# Patient Record
Sex: FEMALE | Race: BLACK OR AFRICAN AMERICAN
[De-identification: names, ages, dates, MRNs, and addresses within clinical notes are randomized per-mention and may not be internally consistent; named-entity substitution may affect disease eponyms.]

---

## 2019-03-09 ENCOUNTER — APPOINTMENT (OUTPATIENT)
Dept: ORTHOPEDIC SURGERY | Facility: CLINIC | Age: 59
End: 2019-03-09
Payer: MEDICARE

## 2019-03-09 VITALS
SYSTOLIC BLOOD PRESSURE: 139 MMHG | BODY MASS INDEX: 30.96 KG/M2 | WEIGHT: 164 LBS | HEART RATE: 70 BPM | DIASTOLIC BLOOD PRESSURE: 79 MMHG | HEIGHT: 61 IN

## 2019-03-09 DIAGNOSIS — Z80.9 FAMILY HISTORY OF MALIGNANT NEOPLASM, UNSPECIFIED: ICD-10-CM

## 2019-03-09 DIAGNOSIS — S93.491A SPRAIN OF OTHER LIGAMENT OF RIGHT ANKLE, INITIAL ENCOUNTER: ICD-10-CM

## 2019-03-09 DIAGNOSIS — Z78.9 OTHER SPECIFIED HEALTH STATUS: ICD-10-CM

## 2019-03-09 PROCEDURE — 99203 OFFICE O/P NEW LOW 30 MIN: CPT

## 2019-03-09 RX ORDER — CYCLOBENZAPRINE HYDROCHLORIDE 7.5 MG/1
TABLET, FILM COATED ORAL
Refills: 0 | Status: ACTIVE | COMMUNITY

## 2019-03-09 NOTE — PHYSICAL EXAM
[UE/LE] : Sensory: Intact in bilateral upper & lower extremities [ALL] : dorsalis pedis, posterior tibial, femoral, popliteal, and radial 2+ and symmetric bilaterally [Normal] : Oriented to person, place, and time, insight and judgement were intact and the affect was normal [Poor Appearance] : well-appearing [Acute Distress] : not in acute distress [de-identified] : Ankle \par Skin warm, Dry, no lesions, no erythema, no bleeding, no open wounds\par No Scars \par Pulse to DP and PT intact \par Sensation to touch intact \par Syndesmotic Squeeze test - Negative\par \par No deformities noted to foot or ankle \par \par Swelling - positive swelling over lateral mall\par Tenderness - ATFL and CF. mild tenderness to 5th metatarsal \par No tenderness to \par Lateral Mall, Medial Mall, Achilles, Calcaneus, mid foot, toes. \par No Anterior Drawer noted \par Barahona Test - reveals intact Achilles  \par Torres's Test Negative \par \par ROM\par Plantarflexion - 0-50\par Dorsiflexion - 0-20\par Inversion - 0-35\par Eversion 0-15\par \par No tenderness to proximal fibula\par  [de-identified] : 3 view right foot xray reveals no acute fx \par 3 view right ankle xray reveals no acute fx

## 2019-03-09 NOTE — DISCUSSION/SUMMARY
[de-identified] : Discussion had with patient \par Patient will follow up with Stephanie as needed\par Patient aware must follow up with MD for further eval and treatment \par Patient will start Physical Therapy \par Patients questions were answered and patient is satisfied with today's visit\par

## 2019-03-09 NOTE — HISTORY OF PRESENT ILLNESS
[Pain Location] : pain [de-identified] : Patient is a 58 year old female who presents c/o right ankle pain s/p tripping and twisting ankle last week. patient states pain located to lateral ankle positive swelling. patient has been weight bearing and using wrap for ankle

## 2019-07-12 ENCOUNTER — APPOINTMENT (OUTPATIENT)
Dept: DERMATOLOGY | Facility: CLINIC | Age: 59
End: 2019-07-12
Payer: MEDICARE

## 2019-07-12 PROCEDURE — 99203 OFFICE O/P NEW LOW 30 MIN: CPT

## 2019-07-15 ENCOUNTER — APPOINTMENT (OUTPATIENT)
Dept: ORTHOPEDIC SURGERY | Facility: CLINIC | Age: 59
End: 2019-07-15
Payer: MEDICARE

## 2019-07-15 ENCOUNTER — TRANSCRIPTION ENCOUNTER (OUTPATIENT)
Age: 59
End: 2019-07-15

## 2019-07-15 VITALS
HEIGHT: 61 IN | HEART RATE: 78 BPM | WEIGHT: 164 LBS | SYSTOLIC BLOOD PRESSURE: 118 MMHG | BODY MASS INDEX: 30.96 KG/M2 | DIASTOLIC BLOOD PRESSURE: 72 MMHG

## 2019-07-15 DIAGNOSIS — M25.561 PAIN IN RIGHT KNEE: ICD-10-CM

## 2019-07-15 DIAGNOSIS — M22.2X1 PATELLOFEMORAL DISORDERS, RIGHT KNEE: ICD-10-CM

## 2019-07-15 DIAGNOSIS — M54.41 LUMBAGO WITH SCIATICA, RIGHT SIDE: ICD-10-CM

## 2019-07-15 PROCEDURE — 99214 OFFICE O/P EST MOD 30 MIN: CPT

## 2019-07-15 RX ORDER — IBUPROFEN 600 MG/1
600 TABLET, FILM COATED ORAL 3 TIMES DAILY
Qty: 40 | Refills: 3 | Status: ACTIVE | COMMUNITY
Start: 2019-07-15 | End: 1900-01-01

## 2019-07-15 NOTE — HISTORY OF PRESENT ILLNESS
[FreeTextEntry1] : Mateo is a new patient with right-sided lower back pain and sciatica. She does have sciatic symptoms going down the leg. Denies bowel or bladder incontinence. No foot drop present. Has had an epidural steroid injection in the past which helped her. Pain scale today 5/10.

## 2019-07-15 NOTE — DISCUSSION/SUMMARY
[de-identified] : Assessment: Right-sided low back pain with sciatica right lower extremity.\par \par Plan:\par At this point in time I would like her to followup with team physiatry. She can followup with us on an as-needed basis. I explained to her that stretching and strengthening program at physical therapy is important. I gave her a physical therapy prescription today to start outpatient physical therapy. I also gave her ibuprofen 600 mg. All of her questions were answered and she is on board with this treatment course.

## 2019-07-15 NOTE — PHYSICAL EXAM
[de-identified] : Lumbar spine:\par \par General: Alert and oriented x3.  In no acute distress.  Pleasant in nature with a normal affect.  No apparent respiratory distress. \par Inspection: No swelling, No scoliosis present.\par \par ROM:\par Forward Flexion: 75 degrees\par Extension: 10 degrees\par Lateral Flexion to Left: 40 degrees\par Lateral Flexion to Right: 40 degrees\par Rotation to Left: 45 degrees\par Rotation to Right: 45 degrees\par \par Normal Hip ROM.\par \par Palpation: \par Thoracolumbar Paraspinal Muscles: Post\par Costovertebral Angle Pain/Spine Percussion: Negative for pain over the Kidney's with Spine Percussion.\par \par Special Tests:\par Straight Leg Raise: Positive right lower extremity\par \par Neurovascularly Intact Sensory and Motor with No Foot Drop present on examination today.  [de-identified] : No imaging performed today.

## 2019-10-04 ENCOUNTER — APPOINTMENT (OUTPATIENT)
Dept: DERMATOLOGY | Facility: CLINIC | Age: 59
End: 2019-10-04
Payer: MEDICARE

## 2019-10-04 PROCEDURE — 99213 OFFICE O/P EST LOW 20 MIN: CPT

## 2019-12-20 ENCOUNTER — APPOINTMENT (OUTPATIENT)
Dept: DERMATOLOGY | Facility: CLINIC | Age: 59
End: 2019-12-20
Payer: MEDICARE

## 2019-12-20 PROCEDURE — 99213 OFFICE O/P EST LOW 20 MIN: CPT

## 2019-12-20 RX ORDER — FLUOCINONIDE 0.05 MG/G
0.05 OINTMENT TOPICAL TWICE DAILY
Qty: 1 | Refills: 2 | Status: ACTIVE | COMMUNITY
Start: 2019-10-04 | End: 1900-01-01

## 2019-12-30 ENCOUNTER — TRANSCRIPTION ENCOUNTER (OUTPATIENT)
Age: 59
End: 2019-12-30

## 2020-01-08 ENCOUNTER — APPOINTMENT (OUTPATIENT)
Dept: ORTHOPEDIC SURGERY | Facility: CLINIC | Age: 60
End: 2020-01-08
Payer: MEDICARE

## 2020-01-08 DIAGNOSIS — M75.51 BURSITIS OF RIGHT SHOULDER: ICD-10-CM

## 2020-01-08 DIAGNOSIS — M75.81 OTHER SHOULDER LESIONS, RIGHT SHOULDER: ICD-10-CM

## 2020-01-08 PROCEDURE — 99214 OFFICE O/P EST MOD 30 MIN: CPT | Mod: 25

## 2020-01-08 PROCEDURE — 20610 DRAIN/INJ JOINT/BURSA W/O US: CPT | Mod: RT

## 2020-01-08 RX ORDER — PANTOPRAZOLE 40 MG/1
40 TABLET, DELAYED RELEASE ORAL
Qty: 30 | Refills: 0 | Status: ACTIVE | COMMUNITY
Start: 2020-01-08 | End: 1900-01-01

## 2020-01-08 NOTE — HISTORY OF PRESENT ILLNESS
[FreeTextEntry1] : 01/08/2020: The patient is a 59-year-old right-hand-dominant female who presents to the office with right shoulder pain. She denies any specific injury but said it could be from a trip and fall in August of 2019. She describes the pain as in the superior aspect of the shoulder with radiation to the lateral aspect of the shoulder. She also has some right-sided neck pain. She finds that her symptoms are worse at night when she is trying to find a position of comfort in bed. She has taken Advil, Flexeril and ice with mild relief. She was recently seen at urgent care and x-rays were done around [place] showed no fractures or dislocations.

## 2020-01-08 NOTE — PROCEDURE
[FreeTextEntry1] : Injection: Shoulder Right\par \par There was a discussion with the patient regarding this procedure and all questions/concerns were answered.  All of the risks, benefits and alternatives were discussed at length.  These include but are not limited to bleeding, infection, and allergic reaction.  Alcohol was used to clean, sterilize and prep the skin at the injection site on the posterolateral aspect of the shoulder.  Ethyl chloride spray was used as a topical anesthetic.  A 22G needle was used to inject 3cc of 1% lidocaine and 1cc of 40mg/mL methylprednisolone into the shoulder.  A sterile bandage was applied to the site of the injection.  The patient tolerated the procedure well and there were no complications.\par \par

## 2020-01-08 NOTE — ASSESSMENT
[FreeTextEntry1] : Patient with right shoulder pain consistent with rotator cuff tendinitis and bursitis of the right shoulder. The nature of these conditions were described at length with the patient she verbalized understanding. Today a cortisone injection was administered to the right subacromial space and the patient tolerated the procedure well. I will start the patient on meloxicam for an anti-inflammatory. The patient was also given a physical therapy prescription. She will follow back up in the office in 4-6 weeks should her symptoms not improve for further treatment options.

## 2020-01-08 NOTE — CONSULT LETTER
[Consult Letter:] : I had the pleasure of evaluating your patient, [unfilled]. [Please see my note below.] : Please see my note below. [Consult Closing:] : Thank you very much for allowing me to participate in the care of this patient.  If you have any questions, please do not hesitate to contact me. [Sincerely,] : Sincerely, [FreeTextEntry3] : Dr. Ioana Andrews\par Orthopaedic Surgery\par Hand & Upper Extremity.\par

## 2020-01-08 NOTE — PHYSICAL EXAM
[Normal Finger/nose] : finger to nose coordination [Normal] : Oriented to person, place, and time, insight and judgement were intact and the affect was normal [de-identified] : Right Shoulder Exam\par \par Inspection: No malalignment, No defects\par Skin: No masses, No lesions\par Neck: Negative Spurlings, full ROM without pain\par ROM: Full range of motion of the right shoulder with forward flexion, abduction, internal and external rotation\par Painful arc ROM: Overhead\par Tenderness: No bicipital tenderness, no tenderness to the greater tuberosity/RTC insertion, no anterior shoulder/lesser tuberosity tenderness\par Strength: 5/5 ER, 5/5 IR in adduction, 5/5 supraspinatus testing\par AC Joint: No ttp, no pain with cross arm testing\par Biceps: Speed negative, Yergusons negative\par Impingement test: Positive Smith\par Stability: Negative apprehension, negative anterior/posterior load and shift\par Vasc: 2+ radial pulse\par Neuro: AIN, PIN, Ulnar nerve in tact to motor\par Sensation: In tact to light touch throughout\par \par  [de-identified] : RUIR [de-identified] : 3 x-ray views of the right shoulder were reviewed from an outside institution. There are no fractures or dislocations noted.

## 2020-04-03 ENCOUNTER — RX RENEWAL (OUTPATIENT)
Age: 60
End: 2020-04-03

## 2020-05-06 ENCOUNTER — RX RENEWAL (OUTPATIENT)
Age: 60
End: 2020-05-06

## 2020-05-06 RX ORDER — MELOXICAM 15 MG/1
15 TABLET ORAL
Qty: 30 | Refills: 0 | Status: ACTIVE | COMMUNITY
Start: 2019-07-15 | End: 1900-01-01

## 2020-06-11 ENCOUNTER — RX RENEWAL (OUTPATIENT)
Age: 60
End: 2020-06-11

## 2020-07-01 ENCOUNTER — APPOINTMENT (OUTPATIENT)
Dept: DERMATOLOGY | Facility: CLINIC | Age: 60
End: 2020-07-01
Payer: MEDICARE

## 2020-07-01 VITALS — TEMPERATURE: 97.1 F

## 2020-07-01 PROCEDURE — 96372 THER/PROPH/DIAG INJ SC/IM: CPT

## 2020-07-01 PROCEDURE — 99213 OFFICE O/P EST LOW 20 MIN: CPT | Mod: 25

## 2022-04-05 ENCOUNTER — APPOINTMENT (OUTPATIENT)
Dept: DERMATOLOGY | Facility: CLINIC | Age: 62
End: 2022-04-05
Payer: MEDICARE

## 2022-04-05 DIAGNOSIS — R21 RASH AND OTHER NONSPECIFIC SKIN ERUPTION: ICD-10-CM

## 2022-04-05 PROCEDURE — 96372 THER/PROPH/DIAG INJ SC/IM: CPT

## 2022-04-05 PROCEDURE — 99214 OFFICE O/P EST MOD 30 MIN: CPT | Mod: 25

## 2022-04-05 RX ORDER — BETAMETHASONE DIPROPIONATE 0.5 MG/G
0.05 OINTMENT, AUGMENTED TOPICAL TWICE DAILY
Qty: 50 | Refills: 2 | Status: ACTIVE | COMMUNITY
Start: 2022-04-05 | End: 1900-01-01

## 2022-04-05 NOTE — ASSESSMENT
[FreeTextEntry1] : 1. Lichen planus\par on extremities, abdomen, flaring\par - c/w betamethasone ointment PRN itch for 2 weeks on, 2 weeks off; SED\par - IMK as below\par \par Intramuscular injection of Kenalog, 40 mg/ml\par A total of 1 ml was injected to the L deltoid muscle.\par The risks/benefits/alternatives of intramuscular steroid injection were reviewed with the patient which include but are not limited to pain, atrophy, or dispigmentation at injection site; damage to nerve or blood vessels; prolonged exposure to steroid which may result in increase in blood pressure or blood sugar, and fracture or necrosis of bones and joints. The patient tolerated the procedure well. \par

## 2022-04-05 NOTE — PHYSICAL EXAM
[Alert] : alert [Oriented x 3] : ~L oriented x 3 [Well Nourished] : well nourished [Conjunctiva Non-injected] : conjunctiva non-injected [No Visual Lymphadenopathy] : no visual  lymphadenopathy [No Clubbing] : no clubbing [No Edema] : no edema [No Bromhidrosis] : no bromhidrosis [No Chromhidrosis] : no chromhidrosis [FreeTextEntry3] : scattered red/purple excoriated papules on arms, legs, abdomen

## 2022-04-05 NOTE — HISTORY OF PRESENT ILLNESS
[FreeTextEntry1] : f/u lichen planus [de-identified] : 61 year old female here with lichen planusx years. Patient notes flaring over the past week on legs and abdomen. Using betamethasone BID with some improvement. Patient interested in IMK as this has helped in the past with flares. \par \par Previous tx:  Has previously been treated with UVB with some success but did not want to commitment to treatments again because was taking pill prior to tx but said it was too expensive.

## 2022-05-03 ENCOUNTER — APPOINTMENT (OUTPATIENT)
Dept: DERMATOLOGY | Facility: CLINIC | Age: 62
End: 2022-05-03
Payer: MEDICARE

## 2022-05-03 PROCEDURE — 99214 OFFICE O/P EST MOD 30 MIN: CPT

## 2022-05-03 RX ORDER — BETAMETHASONE DIPROPIONATE 0.5 MG/G
0.05 OINTMENT, AUGMENTED TOPICAL TWICE DAILY
Qty: 1 | Refills: 2 | Status: DISCONTINUED | COMMUNITY
Start: 2020-07-01 | End: 2022-05-03

## 2022-05-03 RX ORDER — TRIAMCINOLONE ACETONIDE 1 MG/G
0.1 OINTMENT TOPICAL
Qty: 1 | Refills: 0 | Status: ACTIVE | COMMUNITY
Start: 2019-07-12 | End: 1900-01-01

## 2022-06-14 ENCOUNTER — APPOINTMENT (OUTPATIENT)
Dept: DERMATOLOGY | Facility: CLINIC | Age: 62
End: 2022-06-14
Payer: MEDICARE

## 2022-06-14 DIAGNOSIS — D48.5 NEOPLASM OF UNCERTAIN BEHAVIOR OF SKIN: ICD-10-CM

## 2022-06-14 LAB
ALBUMIN SERPL ELPH-MCNC: 4.7 G/DL
ALP BLD-CCNC: 95 U/L
ALT SERPL-CCNC: 23 U/L
ANION GAP SERPL CALC-SCNC: 14 MMOL/L
AST SERPL-CCNC: 21 U/L
BASOPHILS # BLD AUTO: 0.02 K/UL
BASOPHILS NFR BLD AUTO: 0.3 %
BILIRUB SERPL-MCNC: 0.3 MG/DL
BUN SERPL-MCNC: 14 MG/DL
CALCIUM SERPL-MCNC: 9.5 MG/DL
CHLORIDE SERPL-SCNC: 104 MMOL/L
CO2 SERPL-SCNC: 24 MMOL/L
CREAT SERPL-MCNC: 0.73 MG/DL
EGFR: 95 ML/MIN/1.73M2
EOSINOPHIL # BLD AUTO: 0.1 K/UL
EOSINOPHIL NFR BLD AUTO: 1.7 %
GLUCOSE SERPL-MCNC: 81 MG/DL
HCT VFR BLD CALC: 40.6 %
HGB BLD-MCNC: 12.9 G/DL
IMM GRANULOCYTES NFR BLD AUTO: 0.7 %
LYMPHOCYTES # BLD AUTO: 2.03 K/UL
LYMPHOCYTES NFR BLD AUTO: 33.7 %
MAN DIFF?: NORMAL
MCHC RBC-ENTMCNC: 28.4 PG
MCHC RBC-ENTMCNC: 31.8 GM/DL
MCV RBC AUTO: 89.4 FL
MONOCYTES # BLD AUTO: 0.64 K/UL
MONOCYTES NFR BLD AUTO: 10.6 %
NEUTROPHILS # BLD AUTO: 3.2 K/UL
NEUTROPHILS NFR BLD AUTO: 53 %
PLATELET # BLD AUTO: 247 K/UL
POTASSIUM SERPL-SCNC: 4.1 MMOL/L
PROT SERPL-MCNC: 7.4 G/DL
RBC # BLD: 4.54 M/UL
RBC # FLD: 13 %
SODIUM SERPL-SCNC: 141 MMOL/L
WBC # FLD AUTO: 6.03 K/UL

## 2022-06-14 PROCEDURE — 11102 TANGNTL BX SKIN SINGLE LES: CPT

## 2022-06-14 PROCEDURE — 99214 OFFICE O/P EST MOD 30 MIN: CPT | Mod: 25

## 2022-06-15 LAB
HAV IGM SER QL: NONREACTIVE
HBV CORE IGG+IGM SER QL: NONREACTIVE
HBV CORE IGM SER QL: NONREACTIVE
HBV CORE IGM SER QL: NONREACTIVE
HBV SURFACE AB SER QL: NONREACTIVE
HBV SURFACE AG SER QL: NONREACTIVE
HBV SURFACE AG SER QL: NONREACTIVE
HCV AB SER QL: NONREACTIVE
HCV S/CO RATIO: 0.14 S/CO
HIV1+2 AB SPEC QL IA.RAPID: NONREACTIVE

## 2022-06-17 ENCOUNTER — NON-APPOINTMENT (OUTPATIENT)
Age: 62
End: 2022-06-17

## 2022-06-20 ENCOUNTER — APPOINTMENT (OUTPATIENT)
Dept: DERMATOLOGY | Facility: CLINIC | Age: 62
End: 2022-06-20
Payer: MEDICARE

## 2022-06-20 PROCEDURE — 96910 PHOTCHMTX TAR&UVB/PTRLTM&UVB: CPT

## 2022-06-22 ENCOUNTER — APPOINTMENT (OUTPATIENT)
Dept: DERMATOLOGY | Facility: CLINIC | Age: 62
End: 2022-06-22
Payer: MEDICARE

## 2022-06-22 PROCEDURE — 96910 PHOTCHMTX TAR&UVB/PTRLTM&UVB: CPT

## 2022-06-22 NOTE — DISCUSSION/SUMMARY
[FreeTextEntry1] : The patient presents for UVB Light therapy\par The patient tolerated the last treatment well, without significant erythema or a burn.\par The patient confirmed that there are no new medications.\par The patient was wearing the appropriate UV protective eye glasses.\par The treatment was administered as follow.\par \par 0.2 Joules. Maximum time 0 minutes and 20 seconds\par \par Lindsay #1\par \par The patient tolerated the treatment well and was instructed to notify the office if there is significant redness or a burn over the next 48 hours.\par

## 2022-06-24 ENCOUNTER — APPOINTMENT (OUTPATIENT)
Dept: DERMATOLOGY | Facility: CLINIC | Age: 62
End: 2022-06-24
Payer: MEDICARE

## 2022-06-24 PROCEDURE — 96910 PHOTCHMTX TAR&UVB/PTRLTM&UVB: CPT

## 2022-06-25 NOTE — DISCUSSION/SUMMARY
[FreeTextEntry1] : The patient presents for UVB Light therapy\par The patient tolerated the last treatment well, without significant erythema or a burn.\par The patient confirmed that there are no new medications.\par The patient was wearing the appropriate UV protective eye glasses.\par The treatment was administered as follow.\par \par 0.3 Joules. Maximum time 0 minutes and 30 seconds\par \par Lindsay #1\par \par The patient tolerated the treatment well and was instructed to notify the office if there is significant redness or a burn over the next 48 hours.\par

## 2022-06-27 ENCOUNTER — APPOINTMENT (OUTPATIENT)
Dept: DERMATOLOGY | Facility: CLINIC | Age: 62
End: 2022-06-27

## 2022-06-27 PROCEDURE — 96910 PHOTCHMTX TAR&UVB/PTRLTM&UVB: CPT

## 2022-06-27 NOTE — DISCUSSION/SUMMARY
[FreeTextEntry1] : The patient presents for UVB light therapy.\par The patient tolerated the last treatment well, without significant erythema or a burn.\par The patient confirmed that there are no new medications. \par The patient was wearing the appropriate UV protective eye glasses.\par The treatment was administrated as follows:\par \par       0.4   Joules.   Maximum time      0     minutes and       40       seconds.\par \par Lindsay# 1\par \par The patient tolerated the treatment well and was instructed to notify the office of there is significant redness or a burn over the next 48 hours.\par

## 2022-06-29 ENCOUNTER — APPOINTMENT (OUTPATIENT)
Dept: DERMATOLOGY | Facility: CLINIC | Age: 62
End: 2022-06-29

## 2022-06-29 PROCEDURE — 96910 PHOTCHMTX TAR&UVB/PTRLTM&UVB: CPT

## 2022-06-29 NOTE — DISCUSSION/SUMMARY
[FreeTextEntry1] : The patient presents for UVB Light therapy\par The patient tolerated the last treatment well, without significant erythema or a burn.\par The patient confirmed that there are no new medications.\par The patient was wearing the appropriate UV protective eye glasses.\par The treatment was administered as follow.\par \par 0.5 Joules. Maximum time 0 minutes and 50 seconds\par \par Lindsay #1\par \par The patient tolerated the treatment well and was instructed to notify the office if there is significant redness or a burn over the next 48 hours.\par

## 2022-07-01 NOTE — DISCUSSION/SUMMARY
[FreeTextEntry1] : The patient presents for UVB light therapy.\par The patient tolerated the last treatment well, without significant erythema or a burn.\par The patient confirmed that there are no new medications. \par The patient was wearing the appropriate UV protective eye glasses.\par The treatment was administrated as follows:\par \par      0.6    Joules.   Maximum time       1    minutes and       00       seconds.\par \par Lindsay# 1\par \par The patient tolerated the treatment well and was instructed to notify the office of there is significant redness or a burn over the next 48 hours.\par

## 2022-07-05 ENCOUNTER — APPOINTMENT (OUTPATIENT)
Dept: DERMATOLOGY | Facility: CLINIC | Age: 62
End: 2022-07-05

## 2022-07-05 PROCEDURE — 96910 PHOTCHMTX TAR&UVB/PTRLTM&UVB: CPT

## 2022-07-07 ENCOUNTER — APPOINTMENT (OUTPATIENT)
Dept: DERMATOLOGY | Facility: CLINIC | Age: 62
End: 2022-07-07

## 2022-07-07 PROCEDURE — 96910 PHOTCHMTX TAR&UVB/PTRLTM&UVB: CPT

## 2022-07-07 NOTE — DISCUSSION/SUMMARY
[FreeTextEntry1] : The patient presents for UVB Light therapy\par The patient tolerated the last treatment well, without significant erythema or a burn.\par The patient confirmed that there are no new medications.\par The patient was wearing the appropriate UV protective eye glasses.\par The treatment was administered as follow.\par \par  0.7Joules. Maximum time 1 minutes and 10 seconds\par \par Lindsay #1\par \par The patient tolerated the treatment well and was instructed to notify the office if there is significant redness or a burn over the next 48 hours.\par

## 2022-07-11 ENCOUNTER — APPOINTMENT (OUTPATIENT)
Dept: DERMATOLOGY | Facility: CLINIC | Age: 62
End: 2022-07-11

## 2022-07-11 PROCEDURE — 96910 PHOTCHMTX TAR&UVB/PTRLTM&UVB: CPT

## 2022-07-11 NOTE — DISCUSSION/SUMMARY
[FreeTextEntry1] : The patient presents for UVB light therapy.\par The patient tolerated the last treatment well, without significant erythema or a burn.\par The patient confirmed that there are no new medications. \par The patient was wearing the appropriate UV protective eye glasses.\par The treatment was administrated as follows:\par \par        0.8  Joules.   Maximum time        1   minutes and       20       seconds.\par \par Lindsay# 1\par \par The patient tolerated the treatment well and was instructed to notify the office of there is significant redness or a burn over the next 48 hours.\par

## 2022-07-13 ENCOUNTER — APPOINTMENT (OUTPATIENT)
Dept: DERMATOLOGY | Facility: CLINIC | Age: 62
End: 2022-07-13

## 2022-07-13 PROCEDURE — 96910 PHOTCHMTX TAR&UVB/PTRLTM&UVB: CPT

## 2022-07-13 NOTE — DISCUSSION/SUMMARY
[FreeTextEntry1] : The patient presents for UVB light therapy.\par The patient tolerated the last treatment well, without significant erythema or a burn.\par The patient confirmed that there are no new medications.\par The patient was wearing the appropriate UV protective eye glasses.\par The treatment was administered as follows:\par \par 0.9 Joules. Maximum time 1 minutes and 31 seconds\par \par Lindsay # 1\par \par The patient tolerated the treatment well and was instructed to notify the office of there is significant redness or a burn over the next 48 hours.\par \par \par

## 2022-07-15 ENCOUNTER — APPOINTMENT (OUTPATIENT)
Dept: DERMATOLOGY | Facility: CLINIC | Age: 62
End: 2022-07-15

## 2022-07-15 PROCEDURE — 96910 PHOTCHMTX TAR&UVB/PTRLTM&UVB: CPT

## 2022-07-18 ENCOUNTER — APPOINTMENT (OUTPATIENT)
Dept: DERMATOLOGY | Facility: CLINIC | Age: 62
End: 2022-07-18

## 2022-07-18 PROCEDURE — 96910 PHOTCHMTX TAR&UVB/PTRLTM&UVB: CPT

## 2022-07-18 NOTE — DISCUSSION/SUMMARY
[FreeTextEntry1] : The patient presents for UVB light therapy.\par The patient tolerated the last treatment well, without significant erythema or a burn.\par The patient confirmed that there are no new medications. \par The patient was wearing the appropriate UV protective eye glasses.\par The treatment was administrated as follows:\par \par       1.0   Joules.   Maximum time      1     minutes and         41     seconds.\par \par Lindsay# 1\par \par The patient tolerated the treatment well and was instructed to notify the office of there is significant redness or a burn over the next 48 hours.\par

## 2022-07-18 NOTE — DISCUSSION/SUMMARY
[FreeTextEntry1] : The patient presents for UVB Light therapy\par The patient tolerated the last treatment well, without significant erythema or a burn.\par The patient confirmed that there are no new medications.\par The patient was wearing the appropriate UV protective eye glasses.\par The treatment was administered as follow.\par \par 1.1 Joules. Maximum time 1 minutes and 51 seconds\par \par Lindsay #1\par \par The patient tolerated the treatment well and was instructed to notify the office if there is significant redness or a burn over the next 48 hours.\par

## 2022-07-20 NOTE — DISCUSSION/SUMMARY
[FreeTextEntry1] : The patient presents for UVB light therapy.\par The patient tolerated the last treatment well, without significant erythema or a burn.\par The patient confirmed that there are no new medications.\par The patient was wearing the appropriate UV protective eye glasses.\par The treatment was administered as follows:\par \par 1.2 Joules.     Maximum time 2 minutes and 01 seconds\par \par Lindsay # 1\par \par The patient tolerated the treatment well and was instructed to notify the office of there is significant redness or a burn over the next 48 hours.\par \par \par

## 2022-07-25 ENCOUNTER — APPOINTMENT (OUTPATIENT)
Dept: DERMATOLOGY | Facility: CLINIC | Age: 62
End: 2022-07-25

## 2022-07-25 PROCEDURE — 96910 PHOTCHMTX TAR&UVB/PTRLTM&UVB: CPT

## 2022-07-27 ENCOUNTER — APPOINTMENT (OUTPATIENT)
Dept: DERMATOLOGY | Facility: CLINIC | Age: 62
End: 2022-07-27

## 2022-07-27 PROCEDURE — 96910 PHOTCHMTX TAR&UVB/PTRLTM&UVB: CPT

## 2022-07-27 NOTE — DISCUSSION/SUMMARY
[FreeTextEntry1] : The patient presents for UVB light therapy.\par The patient tolerated the last treatment well, without significant erythema or a burn.\par The patient confirmed that there are no new medications.\par The patient was wearing the appropriate UV protective eye glasses.\par The treatment was administered as follows:\par \par 1.3  Joules. Maximum time 2 minutes and 11 seconds\par \par Lindsay # 1\par \par The patient tolerated the treatment well and was instructed to notify the office of there is significant redness or a burn over the next 48 hours.\par \par \par

## 2022-07-29 ENCOUNTER — APPOINTMENT (OUTPATIENT)
Dept: DERMATOLOGY | Facility: CLINIC | Age: 62
End: 2022-07-29

## 2022-07-29 PROCEDURE — 96910 PHOTCHMTX TAR&UVB/PTRLTM&UVB: CPT

## 2022-07-29 NOTE — DISCUSSION/SUMMARY
[FreeTextEntry1] : The patient presents for UVB Light therapy\par The patient tolerated the last treatment well, without significant erythema or a burn.\par The patient confirmed that there are no new medications.\par The patient was wearing the appropriate UV protective eye glasses.\par The treatment was administered as follow.\par \par 1.4 Joules. Maximum time 2 minutes and 21 seconds\par \par Lindsay #1\par \par The patient tolerated the treatment well and was instructed to notify the office if there is significant redness or a burn over the next 48 hours.\par

## 2022-08-01 ENCOUNTER — APPOINTMENT (OUTPATIENT)
Dept: DERMATOLOGY | Facility: CLINIC | Age: 62
End: 2022-08-01

## 2022-08-01 PROCEDURE — 96910 PHOTCHMTX TAR&UVB/PTRLTM&UVB: CPT

## 2022-08-01 NOTE — DISCUSSION/SUMMARY
[FreeTextEntry1] : The patient presents for UVB light therapy.\par The patient tolerated the last treatment well, without significant erythema or a burn.\par The patient confirmed that there are no new medications. \par The patient was wearing the appropriate UV protective eye glasses.\par The treatment was administrated as follows:\par \par     1.5     Joules.   Maximum time       2    minutes and       31       seconds.\par \par Lindsay# 1\par \par The patient tolerated the treatment well and was instructed to notify the office of there is significant redness or a burn over the next 48 hours.\par

## 2022-08-03 ENCOUNTER — APPOINTMENT (OUTPATIENT)
Dept: DERMATOLOGY | Facility: CLINIC | Age: 62
End: 2022-08-03

## 2022-08-03 PROCEDURE — 96910 PHOTCHMTX TAR&UVB/PTRLTM&UVB: CPT

## 2022-08-03 NOTE — DISCUSSION/SUMMARY
[FreeTextEntry1] : The patient presents for UVB Light therapy\par The patient tolerated the last treatment well, without significant erythema or a burn.\par The patient confirmed that there are no new medications.\par The patient was wearing the appropriate UV protective eye glasses.\par The treatment was administered as follow.\par \par 1.6 Joules. Maximum time 2 minutes and 41 seconds\par \par Lindsay #1\par \par The patient tolerated the treatment well and was instructed to notify the office if there is significant redness or a burn over the next 48 hours.\par

## 2022-08-05 ENCOUNTER — APPOINTMENT (OUTPATIENT)
Dept: DERMATOLOGY | Facility: CLINIC | Age: 62
End: 2022-08-05

## 2022-08-05 PROCEDURE — 96910 PHOTCHMTX TAR&UVB/PTRLTM&UVB: CPT

## 2022-08-08 ENCOUNTER — APPOINTMENT (OUTPATIENT)
Dept: DERMATOLOGY | Facility: CLINIC | Age: 62
End: 2022-08-08

## 2022-08-08 PROCEDURE — 96910 PHOTCHMTX TAR&UVB/PTRLTM&UVB: CPT

## 2022-08-08 NOTE — DISCUSSION/SUMMARY
[FreeTextEntry1] : The patient presents for UVB light therapy.\par The patient tolerated the last treatment well, without significant erythema or a burn.\par The patient confirmed that there are no new medications.\par The patient wearing the appropriate UV protective eye glasses.\par The treatment was administered as follows:\par   1.7       Joules.  Maximum time   2    minutes and     51      seconds.\par \par Lindsay #:  1\par \par The patient tolerated the treatment well and was instructed to notify the office if there is significant redness or a burn over the next 48 hours.\par

## 2022-08-08 NOTE — DISCUSSION/SUMMARY
[FreeTextEntry1] : The patient presents for UVB Light therapy\par The patient tolerated the last treatment well, without significant erythema or a burn.\par The patient confirmed that there are no new medications.\par The patient was wearing the appropriate UV protective eye glasses.\par The treatment was administered as follow.\par \par 1.8 Joules. Maximum time 3 minutes and 02 seconds\par \par Lindsay #1\par \par The patient tolerated the treatment well and was instructed to notify the office if there is significant redness or a burn over the next 48 hours.\par

## 2022-08-10 ENCOUNTER — APPOINTMENT (OUTPATIENT)
Dept: DERMATOLOGY | Facility: CLINIC | Age: 62
End: 2022-08-10

## 2022-08-10 PROCEDURE — 96910 PHOTCHMTX TAR&UVB/PTRLTM&UVB: CPT

## 2022-08-10 NOTE — DISCUSSION/SUMMARY
[FreeTextEntry1] : The patient presents for UVB light therapy.\par The patient tolerated the last treatment well, without significant erythema or a burn.\par The patient confirmed that there are no new medications.\par The patient was wearing the appropriate UV protective eye glasses.\par The treatment was administered as follows:\par \par 1.9 Joules. Maximum time 3 minutes and 12 seconds\par \par Lindsay #1 \par \par The patient tolerated the treatment well and was instructed to notify the office of there is significant redness or a burn over the next 48 hours.\par \par \par

## 2022-08-12 NOTE — DISCUSSION/SUMMARY
[FreeTextEntry1] : The patient presents for UVB therapy.\par The patient tolerated the last treatment well, without significant erythema or a burn.\par The patient confirmed that there are no new medications.\par The patient was wearing the appropriate UV protective eye glasses.\par The treatment was administered as follows:\par    2.0       Joules.     Maximum time  3     minutes and   22     seconds.\par \par   \par Lindsay # 1\par \par The patient tolerated the treatment well and was instructed to notify the office if there is a significant\par redness or a burn over the next 48 hours.\par

## 2022-08-15 ENCOUNTER — APPOINTMENT (OUTPATIENT)
Dept: DERMATOLOGY | Facility: CLINIC | Age: 62
End: 2022-08-15

## 2022-08-15 PROCEDURE — 96910 PHOTCHMTX TAR&UVB/PTRLTM&UVB: CPT

## 2022-08-17 ENCOUNTER — APPOINTMENT (OUTPATIENT)
Dept: DERMATOLOGY | Facility: CLINIC | Age: 62
End: 2022-08-17

## 2022-08-17 PROCEDURE — 96910 PHOTCHMTX TAR&UVB/PTRLTM&UVB: CPT

## 2022-08-17 NOTE — DISCUSSION/SUMMARY
[FreeTextEntry1] :  The patient presents for UVB light therapy.\par The patient tolerated the last treatment well, without significant erythema or a burn.\par The patient confirmed that there are no new medications.\par The patient wearing the appropriate UV protective eye glasses.\par The treatment was administered as follows:\par    2.0     Joules. Maximum time  3    minutes and   22   seconds.\par \par \par Lindsay#  1\par \par The patient tolerated the treatment well and was instructed to notify the office if there is significant redness or a burn over the next 48 hours.\par

## 2022-08-23 NOTE — DISCUSSION/SUMMARY
[FreeTextEntry1] : The patient presents for UVB light therapy.\par The patient tolerated the last treatment well, without significant erythema or a burn.\par The patient confirmed that there are no new medications.\par The patient was wearing the appropriate UV protective eye glasses.\par The treatment was administered as follows:\par \par 2.0 Joules. Maximum time 3 minutes and 22 seconds\par \par Lindsay # 1\par \par The patient tolerated the treatment well and was instructed to notify the office of there is significant redness or a burn over the next 48 hours.\par \par \par

## 2022-08-24 ENCOUNTER — APPOINTMENT (OUTPATIENT)
Dept: DERMATOLOGY | Facility: CLINIC | Age: 62
End: 2022-08-24

## 2022-08-24 PROCEDURE — 96910 PHOTCHMTX TAR&UVB/PTRLTM&UVB: CPT

## 2022-08-26 ENCOUNTER — APPOINTMENT (OUTPATIENT)
Dept: DERMATOLOGY | Facility: CLINIC | Age: 62
End: 2022-08-26

## 2022-08-26 PROCEDURE — 96910 PHOTCHMTX TAR&UVB/PTRLTM&UVB: CPT

## 2022-08-26 NOTE — DISCUSSION/SUMMARY
[FreeTextEntry1] : The patient presents for UVB light therapy.\par The patient tolerated the last treatment well, without significant erythema or a burn.\par The patient confirmed that there are no new medications.\par The patient was wearing the appropriate UV protective eye glasses.\par The treatment was administered as follows:\par \par 2.0 Joules. Maximum time 3 minutes and 09 seconds\par \par Lindsay # 1\par \par The patient tolerated the treatment well and was instructed to notify the office of there is significant redness or a burn over the next 48 hours.\par \par \par

## 2022-08-29 ENCOUNTER — APPOINTMENT (OUTPATIENT)
Dept: DERMATOLOGY | Facility: CLINIC | Age: 62
End: 2022-08-29

## 2022-08-29 NOTE — DISCUSSION/SUMMARY
[FreeTextEntry1] : The patient presents for UVB Light therapy\par The patient tolerated the last treatment well, without significant erythema or a burn.\par The patient confirmed that there are no new medications.\par The patient was wearing the appropriate UV protective eye glasses.\par The treatment was administered as follow.\par \par 2.0 Joules. Maximum time 3 minutes and 09 seconds\par \par Lindsay #1\par \par The patient tolerated the treatment well and was instructed to notify the office if there is significant redness or a burn over the next 48 hours.\par

## 2022-08-31 ENCOUNTER — APPOINTMENT (OUTPATIENT)
Dept: DERMATOLOGY | Facility: CLINIC | Age: 62
End: 2022-08-31

## 2022-08-31 PROCEDURE — 96910 PHOTCHMTX TAR&UVB/PTRLTM&UVB: CPT

## 2022-09-06 ENCOUNTER — APPOINTMENT (OUTPATIENT)
Dept: DERMATOLOGY | Facility: CLINIC | Age: 62
End: 2022-09-06

## 2022-09-06 PROCEDURE — 96910 PHOTCHMTX TAR&UVB/PTRLTM&UVB: CPT

## 2022-09-07 ENCOUNTER — APPOINTMENT (OUTPATIENT)
Dept: DERMATOLOGY | Facility: CLINIC | Age: 62
End: 2022-09-07

## 2022-09-08 NOTE — DISCUSSION/SUMMARY
[FreeTextEntry1] : The patient presents for UVB light therapy.\par The patient tolerated the last treatment well, without significant erythema or a burn.\par The patient confirmed that there are no new medications.\par The patient wearing the appropriate UV protective eye glasses.\par The treatment was administered as follows:\par   2.0       Joules.  Maximum time   3    minutes and    09      seconds.\par \par Lindsay #:   1\par \par The patient tolerated the treatment well and was instructed to notify the office if there is significant redness or a burn over the next 48 hours.\par

## 2022-09-12 ENCOUNTER — APPOINTMENT (OUTPATIENT)
Dept: DERMATOLOGY | Facility: CLINIC | Age: 62
End: 2022-09-12

## 2022-09-12 PROCEDURE — 96910 PHOTCHMTX TAR&UVB/PTRLTM&UVB: CPT

## 2022-09-14 ENCOUNTER — APPOINTMENT (OUTPATIENT)
Dept: DERMATOLOGY | Facility: CLINIC | Age: 62
End: 2022-09-14

## 2022-09-14 PROCEDURE — 96910 PHOTCHMTX TAR&UVB/PTRLTM&UVB: CPT

## 2022-09-19 ENCOUNTER — APPOINTMENT (OUTPATIENT)
Dept: DERMATOLOGY | Facility: CLINIC | Age: 62
End: 2022-09-19

## 2022-09-19 PROCEDURE — 96910 PHOTCHMTX TAR&UVB/PTRLTM&UVB: CPT

## 2022-09-21 ENCOUNTER — APPOINTMENT (OUTPATIENT)
Dept: DERMATOLOGY | Facility: CLINIC | Age: 62
End: 2022-09-21

## 2022-09-21 PROCEDURE — 96910 PHOTCHMTX TAR&UVB/PTRLTM&UVB: CPT

## 2022-09-26 ENCOUNTER — APPOINTMENT (OUTPATIENT)
Dept: DERMATOLOGY | Facility: CLINIC | Age: 62
End: 2022-09-26

## 2022-09-26 PROCEDURE — 96910 PHOTCHMTX TAR&UVB/PTRLTM&UVB: CPT

## 2022-09-28 ENCOUNTER — APPOINTMENT (OUTPATIENT)
Dept: DERMATOLOGY | Facility: CLINIC | Age: 62
End: 2022-09-28

## 2022-09-28 PROCEDURE — 96910 PHOTCHMTX TAR&UVB/PTRLTM&UVB: CPT

## 2022-09-30 NOTE — DISCUSSION/SUMMARY
[FreeTextEntry1] : The patient presents for UVB therapy.\par The patient tolerated the last treatment well, without significant erythema or a burn.\par The patient confirmed that there are no new medications.\par The patient was wearing the appropriate UV protective eye glasses.\par The treatment was administered as follows:\par      2.1     Joules.     Maximum time    3   minutes and     19   seconds.\par \par   \par Lindsay # 1\par \par The patient tolerated the treatment well and was instructed to notify the office if there is a significant\par redness or a burn over the next 48 hours.\par

## 2022-10-03 ENCOUNTER — APPOINTMENT (OUTPATIENT)
Dept: DERMATOLOGY | Facility: CLINIC | Age: 62
End: 2022-10-03

## 2022-10-03 PROCEDURE — 96910 PHOTCHMTX TAR&UVB/PTRLTM&UVB: CPT

## 2022-10-05 ENCOUNTER — APPOINTMENT (OUTPATIENT)
Dept: DERMATOLOGY | Facility: CLINIC | Age: 62
End: 2022-10-05

## 2022-10-05 PROCEDURE — 96910 PHOTCHMTX TAR&UVB/PTRLTM&UVB: CPT

## 2022-10-05 NOTE — DISCUSSION/SUMMARY
[FreeTextEntry1] :  The patient presents for UVB light therapy.\par The patient tolerated the last treatment well, without significant erythema or a burn.\par The patient confirmed that there are no new medications.\par The patient wearing the appropriate UV protective eye glasses.\par The treatment was administered as follows:\par    2.0     Joules. Maximum time    3  minutes and   09   seconds.\par \par \par Lindsay#  1\par \par The patient tolerated the treatment well and was instructed to notify the office if there is significant redness or a burn over the next 48 hours.\par

## 2022-10-10 ENCOUNTER — APPOINTMENT (OUTPATIENT)
Dept: DERMATOLOGY | Facility: CLINIC | Age: 62
End: 2022-10-10

## 2022-10-10 PROCEDURE — 96910 PHOTCHMTX TAR&UVB/PTRLTM&UVB: CPT

## 2022-10-12 ENCOUNTER — APPOINTMENT (OUTPATIENT)
Dept: DERMATOLOGY | Facility: CLINIC | Age: 62
End: 2022-10-12

## 2022-10-17 ENCOUNTER — APPOINTMENT (OUTPATIENT)
Dept: DERMATOLOGY | Facility: CLINIC | Age: 62
End: 2022-10-17

## 2022-10-17 PROCEDURE — 96910 PHOTCHMTX TAR&UVB/PTRLTM&UVB: CPT

## 2022-10-19 ENCOUNTER — APPOINTMENT (OUTPATIENT)
Dept: DERMATOLOGY | Facility: CLINIC | Age: 62
End: 2022-10-19

## 2022-10-19 PROCEDURE — 96910 PHOTCHMTX TAR&UVB/PTRLTM&UVB: CPT

## 2022-10-21 ENCOUNTER — APPOINTMENT (OUTPATIENT)
Dept: ORTHOPEDIC SURGERY | Facility: CLINIC | Age: 62
End: 2022-10-21

## 2022-10-21 ENCOUNTER — NON-APPOINTMENT (OUTPATIENT)
Age: 62
End: 2022-10-21

## 2022-10-21 VITALS
BODY MASS INDEX: 32.1 KG/M2 | WEIGHT: 170 LBS | SYSTOLIC BLOOD PRESSURE: 122 MMHG | DIASTOLIC BLOOD PRESSURE: 70 MMHG | HEART RATE: 82 BPM | HEIGHT: 61 IN

## 2022-10-21 DIAGNOSIS — M17.12 UNILATERAL PRIMARY OSTEOARTHRITIS, LEFT KNEE: ICD-10-CM

## 2022-10-21 DIAGNOSIS — S76.312A STRAIN OF MUSCLE, FASCIA AND TENDON OF THE POSTERIOR MUSCLE GROUP AT THIGH LEVEL, LEFT THIGH, INITIAL ENCOUNTER: ICD-10-CM

## 2022-10-21 PROCEDURE — 73564 X-RAY EXAM KNEE 4 OR MORE: CPT | Mod: LT

## 2022-10-21 PROCEDURE — 99213 OFFICE O/P EST LOW 20 MIN: CPT | Mod: 25

## 2022-10-21 PROCEDURE — 20610 DRAIN/INJ JOINT/BURSA W/O US: CPT | Mod: LT

## 2022-10-21 NOTE — DISCUSSION/SUMMARY
[de-identified] : 62-year-old female with left knee pain, mild osteoarthritis, hamstring strain\par \par Extensive discussion of the natural history of this issue was had with the patient.  We discussed the treatment options ranging from conservative therapy which includes anti-inflammatories, steroid injections, physical therapy, weight loss, and activity modification.  Patient states she is going on a trip in November and would like a steroid injection which was given to her in the left knee today.  I gave her handout on knee exercises and stretches to work on.  I recommended an ultrasound to rule out a DVT in her left lower extremity but the patient refused as she said that calf pain completely resolved 1 month ago.  Patient will follow-up in 1 to 2 months if the pain does not resolve.

## 2022-10-21 NOTE — PHYSICAL EXAM
[de-identified] : Left knee\par Inspection: no effusion, no erythema.\par Wounds: none.\par Alignment: Neutral\par Palpation: tender to palpation at medial joint line\par ROM active (in degrees): 0-110 with crepitus\par Ligamentous laxity: all ligaments appear stable, negative ant. drawer test, negative post. drawer test, stable to varus stress test, stable to valgus stress test. Negative Lachman's test\par Meniscal Test: Negative McMurrays, negative Angie.\par Muscle Test: good quad strength. [de-identified] : Left knee xrays, standing AP/Lateral and Merchant films, and 45 degree PA standing view taken today in the office are reviewed and demonstrate preserved joint space, no abnormalities

## 2022-10-21 NOTE — PROCEDURE
[de-identified] : Left knee Injection - Steroid\par The risks, benefits, and alternatives of the injection were reviewed/discussed with the patient today in office and all of their questions were answered. The patient consented to the procedure. The inferolateral injection site was prepped with chloroprep/betadine.  Cold spray was utilized to numb the skin. Utilizing sterile technique, the knee was injected with 1 ml 40 mg [methylprednisolone], 4 ml 1% lidocaine, 5 mL 0.25% bupivacaine. A sterile bandage was applied. The patient tolerated the procedure well and there were no complications.

## 2022-10-21 NOTE — HISTORY OF PRESENT ILLNESS
[de-identified] : 62-year-old female presents with left knee pain that has been ongoing for over 3 months.  Patient states it wakes her up at night.  She has tried ice and sprays.  States stairs are bothersome.  She works at a [employer] and when she has to go up and down the stairs seems to be aggravated.  Does take Advil and Tylenol which helps.  Has been using compression sleeves and socks.  States she had some calf pain 1 month ago but that has resolved with the compression stockings.  Denies allergies

## 2022-10-24 ENCOUNTER — APPOINTMENT (OUTPATIENT)
Dept: DERMATOLOGY | Facility: CLINIC | Age: 62
End: 2022-10-24

## 2022-10-24 PROCEDURE — 96910 PHOTCHMTX TAR&UVB/PTRLTM&UVB: CPT

## 2022-10-27 ENCOUNTER — APPOINTMENT (OUTPATIENT)
Dept: DERMATOLOGY | Facility: CLINIC | Age: 62
End: 2022-10-27

## 2022-10-27 DIAGNOSIS — L81.0 POSTINFLAMMATORY HYPERPIGMENTATION: ICD-10-CM

## 2022-10-27 DIAGNOSIS — L85.3 XEROSIS CUTIS: ICD-10-CM

## 2022-10-27 DIAGNOSIS — L43.9 LICHEN PLANUS, UNSPECIFIED: ICD-10-CM

## 2022-10-27 PROCEDURE — 99214 OFFICE O/P EST MOD 30 MIN: CPT

## 2022-10-27 RX ORDER — PREDNISONE 20 MG/1
20 TABLET ORAL
Qty: 24 | Refills: 0 | Status: DISCONTINUED | COMMUNITY
Start: 2022-05-03 | End: 2022-10-27

## 2022-10-31 ENCOUNTER — APPOINTMENT (OUTPATIENT)
Dept: DERMATOLOGY | Facility: CLINIC | Age: 62
End: 2022-10-31

## 2022-10-31 PROCEDURE — 96910 PHOTCHMTX TAR&UVB/PTRLTM&UVB: CPT

## 2022-11-02 NOTE — DISCUSSION/SUMMARY
[FreeTextEntry1] : The patient presents for UVB light therapy.\par The patient tolerated the last treatment well, without significant erythema or a burn.\par The patient confirmed that there are no new medications.\par The patient was wearing the appropriate UV protective eye glasses.\par The treatment was administered as follows:\par \par 2.0 Joules.Maximum time 3 minutes and 09 seconds\par \par Lindsay # 1\par \par The patient tolerated the treatment well and was instructed to notify the office of there is significant redness or a burn over the next 48 hours.\par \par \par

## 2022-11-03 ENCOUNTER — APPOINTMENT (OUTPATIENT)
Dept: DERMATOLOGY | Facility: CLINIC | Age: 62
End: 2022-11-03

## 2022-11-03 PROCEDURE — 96910 PHOTCHMTX TAR&UVB/PTRLTM&UVB: CPT

## 2022-11-07 ENCOUNTER — APPOINTMENT (OUTPATIENT)
Dept: DERMATOLOGY | Facility: CLINIC | Age: 62
End: 2022-11-07

## 2022-11-07 PROCEDURE — 96910 PHOTCHMTX TAR&UVB/PTRLTM&UVB: CPT

## 2022-11-07 NOTE — DISCUSSION/SUMMARY
[FreeTextEntry1] : The patient presents for UVB Light therapy\par The patient tolerated the last treatment well, without significant erythema or a burn.\par The patient confirmed that there are no new medications.\par The patient was wearing the appropriate UV protective eye glasses.\par The treatment was administered as follow.\par \par 2.0 Joules. Maximum time 3 minutes and 09 seconds\par \par Lindsay # 1\par \par The patient tolerated the treatment well and was instructed to notify the office if there is significant redness or a burn over the next 48 hours.

## 2022-11-10 ENCOUNTER — APPOINTMENT (OUTPATIENT)
Dept: DERMATOLOGY | Facility: CLINIC | Age: 62
End: 2022-11-10

## 2022-11-10 PROCEDURE — 96910 PHOTCHMTX TAR&UVB/PTRLTM&UVB: CPT

## 2022-11-12 NOTE — DISCUSSION/SUMMARY
[FreeTextEntry1] : The patient presents for UVB therapy.\par The patient tolerated the last treatment well, without significant erythema or a burn.\par The patient confirmed that there are no new medications.\par The patient was wearing the appropriate UV protective eye glasses.\par The treatment was administered as follows:\par      2.0     Joules.     Maximum time  3     minutes and      09  seconds.\par \par   \par Lindsay # 1\par \par The patient tolerated the treatment well and was instructed to notify the office if there is a significant\par redness or a burn over the next 48 hours.\par

## 2022-11-14 ENCOUNTER — APPOINTMENT (OUTPATIENT)
Dept: DERMATOLOGY | Facility: CLINIC | Age: 62
End: 2022-11-14

## 2022-11-14 PROCEDURE — 96910 PHOTCHMTX TAR&UVB/PTRLTM&UVB: CPT

## 2022-11-17 ENCOUNTER — APPOINTMENT (OUTPATIENT)
Dept: DERMATOLOGY | Facility: CLINIC | Age: 62
End: 2022-11-17

## 2022-11-17 PROCEDURE — 96910 PHOTCHMTX TAR&UVB/PTRLTM&UVB: CPT

## 2022-11-21 ENCOUNTER — APPOINTMENT (OUTPATIENT)
Dept: DERMATOLOGY | Facility: CLINIC | Age: 62
End: 2022-11-21

## 2022-11-21 PROCEDURE — 96910 PHOTCHMTX TAR&UVB/PTRLTM&UVB: CPT

## 2022-11-28 ENCOUNTER — APPOINTMENT (OUTPATIENT)
Dept: DERMATOLOGY | Facility: CLINIC | Age: 62
End: 2022-11-28

## 2022-12-01 ENCOUNTER — APPOINTMENT (OUTPATIENT)
Dept: DERMATOLOGY | Facility: CLINIC | Age: 62
End: 2022-12-01

## 2022-12-05 ENCOUNTER — APPOINTMENT (OUTPATIENT)
Dept: DERMATOLOGY | Facility: CLINIC | Age: 62
End: 2022-12-05

## 2022-12-06 ENCOUNTER — APPOINTMENT (OUTPATIENT)
Dept: DERMATOLOGY | Facility: CLINIC | Age: 62
End: 2022-12-06

## 2022-12-08 ENCOUNTER — APPOINTMENT (OUTPATIENT)
Dept: DERMATOLOGY | Facility: CLINIC | Age: 62
End: 2022-12-08

## 2022-12-12 ENCOUNTER — APPOINTMENT (OUTPATIENT)
Dept: DERMATOLOGY | Facility: CLINIC | Age: 62
End: 2022-12-12

## 2022-12-15 ENCOUNTER — APPOINTMENT (OUTPATIENT)
Dept: DERMATOLOGY | Facility: CLINIC | Age: 62
End: 2022-12-15

## 2022-12-16 ENCOUNTER — APPOINTMENT (OUTPATIENT)
Dept: DERMATOLOGY | Facility: CLINIC | Age: 62
End: 2022-12-16

## 2022-12-16 PROCEDURE — 96910 PHOTCHMTX TAR&UVB/PTRLTM&UVB: CPT

## 2022-12-16 NOTE — DISCUSSION/SUMMARY
[FreeTextEntry1] : The patient presents for UVB light therapy.\par The patient tolerated the last treatment well, without significant erythema or a burn.\par The patient confirmed that there are no new medications.\par The patient was wearing the appropriate UV protective eye glasses.\par The treatment was administered as follows:\par \par 1.5 Joules. Maximum time 2 minutes and 27 seconds\par \par Lindsay # 1\par \par The patient tolerated the treatment well and was instructed to notify the office of there is significant redness or a burn over the next 48 hours.\par \par \par

## 2022-12-19 ENCOUNTER — APPOINTMENT (OUTPATIENT)
Dept: DERMATOLOGY | Facility: CLINIC | Age: 62
End: 2022-12-19

## 2022-12-22 ENCOUNTER — APPOINTMENT (OUTPATIENT)
Dept: DERMATOLOGY | Facility: CLINIC | Age: 62
End: 2022-12-22

## 2022-12-22 PROCEDURE — 96910 PHOTCHMTX TAR&UVB/PTRLTM&UVB: CPT

## 2022-12-22 NOTE — DISCUSSION/SUMMARY
[FreeTextEntry1] : The patient presents for UVB Light therapy\par The patient tolerated the last treatment well, without significant erythema or a burn.\par The patient confirmed that there are no new medications.\par The patient was wearing the appropriate UV protective eye glasses.\par The treatment was administered as follow.\par \par 1.6 Joules. Maximum time 2 minutes and  37 seconds\par \par Lindsay #1\par \par The patient tolerated the treatment well and was instructed to notify the office if there is significant redness or a burn over the next 48 hours.\par

## 2022-12-29 ENCOUNTER — APPOINTMENT (OUTPATIENT)
Dept: DERMATOLOGY | Facility: CLINIC | Age: 62
End: 2022-12-29
Payer: MEDICARE

## 2022-12-29 PROCEDURE — 96910 PHOTCHMTX TAR&UVB/PTRLTM&UVB: CPT

## 2023-01-02 NOTE — DISCUSSION/SUMMARY
[FreeTextEntry1] : The patient presents for UVB Light therapy\par The patient tolerated the last treatment well, without significant erythema or a burn.\par The patient confirmed that there are no new medications.\par The patient was wearing the appropriate UV protective eye glasses.\par The treatment was administered as follow.\par \par 1.7 Joules. Maximum time 2 minutes and 47 seconds\par \par Lindsay #1\par \par The patient tolerated the treatment well and was instructed to notify the office if there is significant redness or a burn over the next 48 hours.\par

## 2023-01-05 ENCOUNTER — APPOINTMENT (OUTPATIENT)
Dept: DERMATOLOGY | Facility: CLINIC | Age: 63
End: 2023-01-05
Payer: MEDICARE

## 2023-01-05 PROCEDURE — 96910 PHOTCHMTX TAR&UVB/PTRLTM&UVB: CPT

## 2023-01-07 NOTE — DISCUSSION/SUMMARY
[FreeTextEntry1] : The patient presents for UVB light therapy.\par The patient tolerated the last treatment well, without significant erythema or a burn.\par The patient confirmed that there are no new medications.\par The patient was wearing the appropriate UV protective eye glasses.\par The treatment was administered as follows:\par \par 1.8  Joules. Maximum time 2 minutes and 57 seconds\par \par Lindsay # 1\par \par The patient tolerated the treatment well and was instructed to notify the office of there is significant redness or a burn over the next 48 hours.\par \par \par

## 2023-01-12 ENCOUNTER — APPOINTMENT (OUTPATIENT)
Dept: DERMATOLOGY | Facility: CLINIC | Age: 63
End: 2023-01-12
Payer: MEDICARE

## 2023-01-12 PROCEDURE — 96910 PHOTCHMTX TAR&UVB/PTRLTM&UVB: CPT

## 2023-01-12 NOTE — DISCUSSION/SUMMARY
[FreeTextEntry1] :  The patient presents for UVB light therapy.\par The patient tolerated the last treatment well, without significant erythema or a burn.\par The patient confirmed that there are no new medications.\par The patient wearing the appropriate UV protective eye glasses.\par The treatment was administered as follows:\par     1.9    Joules. Maximum time  3   minutes and   07   seconds.\par \par \par Lindsay#  1\par \par The patient tolerated the treatment well and was instructed to notify the office if there is significant redness or a burn over the next 48 hours.\par

## 2023-01-18 ENCOUNTER — APPOINTMENT (OUTPATIENT)
Dept: DERMATOLOGY | Facility: CLINIC | Age: 63
End: 2023-01-18
Payer: MEDICARE

## 2023-01-18 PROCEDURE — 96910 PHOTCHMTX TAR&UVB/PTRLTM&UVB: CPT

## 2023-01-19 NOTE — DISCUSSION/SUMMARY
[FreeTextEntry1] : The patient presents for UVB light therapy.\par The patient tolerated the last treatment well, without significant erythema or a burn.\par The patient confirmed that there are no new medications.\par The patient was wearing the appropriate UV protective eye glasses.\par The treatment was administered as follows:\par \par 2.0 Joules. Maximum time 3 minutes and 17 seconds\par \par Lindsay # 1\par \par The patient tolerated the treatment well and was instructed to notify the office of there is significant redness or a burn over the next 48 hours.\par \par \par

## 2023-01-25 ENCOUNTER — APPOINTMENT (OUTPATIENT)
Dept: DERMATOLOGY | Facility: CLINIC | Age: 63
End: 2023-01-25
Payer: MEDICARE

## 2023-01-25 PROCEDURE — 96910 PHOTCHMTX TAR&UVB/PTRLTM&UVB: CPT

## 2023-01-26 NOTE — DISCUSSION/SUMMARY
[FreeTextEntry1] : The patient presents for UVB light therapy.\par The patient tolerated the last treatment well, without significant erythema or a burn.\par The patient confirmed that there are no new medications.\par The patient wearing the appropriate UV protective eye glasses.\par The treatment was administered as follows:\par     2.0     Joules.  Maximum time   3    minutes and      17    seconds.\par \par Lindsay #:  1\par \par The patient tolerated the treatment well and was instructed to notify the office if there is significant redness or a burn over the next 48 hours.\par

## 2023-02-01 ENCOUNTER — APPOINTMENT (OUTPATIENT)
Dept: DERMATOLOGY | Facility: CLINIC | Age: 63
End: 2023-02-01
Payer: MEDICARE

## 2023-02-01 PROCEDURE — 96910 PHOTCHMTX TAR&UVB/PTRLTM&UVB: CPT

## 2023-02-01 NOTE — DISCUSSION/SUMMARY
[FreeTextEntry1] : The patient presents for UVB Light therapy\par The patient tolerated the last treatment well, without significant erythema or a burn.\par The patient confirmed that there are no new medications.\par The patient was wearing the appropriate UV protective eye glasses.\par The treatment was administered as follow.\par \par 2.0 Joules. Maximum time 3 minutes and 17 seconds.\par \par Lindsay # 1\par \par The patient tolerated the treatment well and was instructed to notify the office if there is significant redness or a burn over the next 48 hours.

## 2023-02-08 ENCOUNTER — APPOINTMENT (OUTPATIENT)
Dept: DERMATOLOGY | Facility: CLINIC | Age: 63
End: 2023-02-08
Payer: MEDICARE

## 2023-02-08 PROCEDURE — 96910 PHOTCHMTX TAR&UVB/PTRLTM&UVB: CPT

## 2023-02-08 NOTE — DISCUSSION/SUMMARY
[FreeTextEntry1] : The patient presents for UVB Light therapy\par The patient tolerated the last treatment well, without significant erythema or a burn.\par The patient confirmed that there are no new medications.\par The patient was wearing the appropriate UV protective eye glasses.\par The treatment was administered as follow.\par \par 2.0 Joules. Maximum time 3 minutes and 17 seconds\par \par Lindsay # 1\par \par The patient tolerated the treatment well and was instructed to notify the office if there is significant redness or a burn over the next 48 hours.

## 2023-02-15 ENCOUNTER — APPOINTMENT (OUTPATIENT)
Dept: DERMATOLOGY | Facility: CLINIC | Age: 63
End: 2023-02-15
Payer: MEDICARE

## 2023-02-15 PROCEDURE — 96910 PHOTCHMTX TAR&UVB/PTRLTM&UVB: CPT

## 2023-11-06 ENCOUNTER — RX RENEWAL (OUTPATIENT)
Age: 63
End: 2023-11-06

## 2023-12-06 ENCOUNTER — RX RENEWAL (OUTPATIENT)
Age: 63
End: 2023-12-06

## 2024-01-02 ENCOUNTER — RX RENEWAL (OUTPATIENT)
Age: 64
End: 2024-01-02

## 2024-01-31 ENCOUNTER — RX RENEWAL (OUTPATIENT)
Age: 64
End: 2024-01-31

## 2024-02-28 ENCOUNTER — RX RENEWAL (OUTPATIENT)
Age: 64
End: 2024-02-28

## 2024-04-01 ENCOUNTER — RX RENEWAL (OUTPATIENT)
Age: 64
End: 2024-04-01

## 2024-04-29 ENCOUNTER — RX RENEWAL (OUTPATIENT)
Age: 64
End: 2024-04-29

## 2024-04-29 RX ORDER — AMMONIUM LACTATE 12 %
12 CREAM (GRAM) TOPICAL
Qty: 385 | Refills: 2 | Status: ACTIVE | COMMUNITY
Start: 2022-10-27 | End: 1900-01-01

## 2025-02-21 ENCOUNTER — APPOINTMENT (OUTPATIENT)
Dept: DERMATOLOGY | Facility: CLINIC | Age: 65
End: 2025-02-21
Payer: MEDICARE

## 2025-02-21 DIAGNOSIS — L72.3 SEBACEOUS CYST: ICD-10-CM

## 2025-02-21 DIAGNOSIS — L81.0 POSTINFLAMMATORY HYPERPIGMENTATION: ICD-10-CM

## 2025-02-21 DIAGNOSIS — L85.3 XEROSIS CUTIS: ICD-10-CM

## 2025-02-21 DIAGNOSIS — L72.0 EPIDERMAL CYST: ICD-10-CM

## 2025-02-21 DIAGNOSIS — L43.9 LICHEN PLANUS, UNSPECIFIED: ICD-10-CM

## 2025-02-21 PROCEDURE — 99214 OFFICE O/P EST MOD 30 MIN: CPT | Mod: 25

## 2025-02-21 PROCEDURE — 11900 INJECT SKIN LESIONS </W 7: CPT

## 2025-06-24 ENCOUNTER — APPOINTMENT (OUTPATIENT)
Dept: DERMATOLOGY | Facility: CLINIC | Age: 65
End: 2025-06-24

## 2025-07-25 ENCOUNTER — APPOINTMENT (OUTPATIENT)
Dept: DERMATOLOGY | Facility: CLINIC | Age: 65
End: 2025-07-25
Payer: MEDICARE

## 2025-07-25 DIAGNOSIS — L43.9 LICHEN PLANUS, UNSPECIFIED: ICD-10-CM

## 2025-07-25 DIAGNOSIS — L85.3 XEROSIS CUTIS: ICD-10-CM

## 2025-07-25 PROCEDURE — 99213 OFFICE O/P EST LOW 20 MIN: CPT
